# Patient Record
Sex: MALE | Race: WHITE | NOT HISPANIC OR LATINO | URBAN - METROPOLITAN AREA
[De-identification: names, ages, dates, MRNs, and addresses within clinical notes are randomized per-mention and may not be internally consistent; named-entity substitution may affect disease eponyms.]

---

## 2024-01-05 ENCOUNTER — APPOINTMENT (RX ONLY)
Dept: URBAN - METROPOLITAN AREA CLINIC 321 | Facility: CLINIC | Age: 64
Setting detail: DERMATOLOGY
End: 2024-01-05

## 2024-01-05 DIAGNOSIS — I78.8 OTHER DISEASES OF CAPILLARIES: ICD-10-CM

## 2024-01-05 DIAGNOSIS — L81.4 OTHER MELANIN HYPERPIGMENTATION: ICD-10-CM

## 2024-01-05 DIAGNOSIS — L82.1 OTHER SEBORRHEIC KERATOSIS: ICD-10-CM

## 2024-01-05 DIAGNOSIS — L57.0 ACTINIC KERATOSIS: ICD-10-CM

## 2024-01-05 DIAGNOSIS — D22 MELANOCYTIC NEVI: ICD-10-CM

## 2024-01-05 PROBLEM — D23.72 OTHER BENIGN NEOPLASM OF SKIN OF LEFT LOWER LIMB, INCLUDING HIP: Status: ACTIVE | Noted: 2024-01-05

## 2024-01-05 PROBLEM — D22.5 MELANOCYTIC NEVI OF TRUNK: Status: ACTIVE | Noted: 2024-01-05

## 2024-01-05 PROCEDURE — 17000 DESTRUCT PREMALG LESION: CPT

## 2024-01-05 PROCEDURE — ? FULL BODY SKIN EXAM

## 2024-01-05 PROCEDURE — ? TREATMENT REGIMEN

## 2024-01-05 PROCEDURE — 99203 OFFICE O/P NEW LOW 30 MIN: CPT | Mod: 25

## 2024-01-05 PROCEDURE — ? COUNSELING

## 2024-01-05 PROCEDURE — ? LIQUID NITROGEN

## 2024-01-05 ASSESSMENT — LOCATION ZONE DERM
LOCATION ZONE: TRUNK
LOCATION ZONE: ARM
LOCATION ZONE: EAR

## 2024-01-05 ASSESSMENT — LOCATION SIMPLE DESCRIPTION DERM
LOCATION SIMPLE: RIGHT EAR
LOCATION SIMPLE: LEFT UPPER BACK
LOCATION SIMPLE: LEFT EAR
LOCATION SIMPLE: LEFT UPPER ARM
LOCATION SIMPLE: ABDOMEN
LOCATION SIMPLE: CHEST

## 2024-01-05 ASSESSMENT — LOCATION DETAILED DESCRIPTION DERM
LOCATION DETAILED: RIGHT LATERAL SUPERIOR CHEST
LOCATION DETAILED: LEFT INFERIOR UPPER BACK
LOCATION DETAILED: LEFT ANTERIOR PROXIMAL UPPER ARM
LOCATION DETAILED: LEFT LATERAL ABDOMEN
LOCATION DETAILED: RIGHT INFERIOR CRUS OF ANTIHELIX
LOCATION DETAILED: LEFT SCAPHA

## 2024-01-05 NOTE — PROCEDURE: LIQUID NITROGEN
Render Note In Bullet Format When Appropriate: No
Application Tool (Optional): Liquid Nitrogen Sprayer
Show Applicator Variable?: Yes
Number Of Freeze-Thaw Cycles: 1 freeze-thaw cycle
Detail Level: Detailed
Consent: The patient's consent was obtained including but not limited to risks of crusting, scabbing, blistering, scarring, darker or lighter pigmentary change, recurrence, incomplete removal and infection.
Duration Of Freeze Thaw-Cycle (Seconds): 10
Post-Care Instructions: I reviewed with the patient in detail post-care instructions. Patient is to wear sunprotection, and avoid picking at any of the treated lesions. Pt may apply Vaseline to crusted or scabbing areas.

## 2024-10-18 ENCOUNTER — APPOINTMENT (RX ONLY)
Dept: URBAN - METROPOLITAN AREA CLINIC 319 | Facility: CLINIC | Age: 64
Setting detail: DERMATOLOGY
End: 2024-10-18

## 2024-10-18 DIAGNOSIS — L71.0 PERIORAL DERMATITIS: ICD-10-CM

## 2024-10-18 PROCEDURE — ? FULL BODY SKIN EXAM - DECLINED

## 2024-10-18 PROCEDURE — ? PRESCRIPTION MEDICATION MANAGEMENT

## 2024-10-18 PROCEDURE — ? PRESCRIPTION

## 2024-10-18 PROCEDURE — 99213 OFFICE O/P EST LOW 20 MIN: CPT

## 2024-10-18 PROCEDURE — ? COUNSELING

## 2024-10-18 RX ORDER — TACROLIMUS 1 MG/G
OINTMENT TOPICAL BID
Qty: 60 | Refills: 1 | Status: ERX | COMMUNITY
Start: 2024-10-18

## 2024-10-18 RX ORDER — DOXYCYCLINE HYCLATE 100 MG/1
CAPSULE, GELATIN COATED ORAL BID
Qty: 60 | Refills: 0 | Status: ERX | COMMUNITY
Start: 2024-10-18

## 2024-10-18 RX ORDER — KETOCONAZOLE 20 MG/G
CREAM TOPICAL QAM
Qty: 60 | Refills: 0 | Status: ERX | COMMUNITY
Start: 2024-10-18

## 2024-10-18 RX ADMIN — KETOCONAZOLE: 20 CREAM TOPICAL at 00:00

## 2024-10-18 RX ADMIN — DOXYCYCLINE HYCLATE: 100 CAPSULE, GELATIN COATED ORAL at 00:00

## 2024-10-18 RX ADMIN — TACROLIMUS: 1 OINTMENT TOPICAL at 00:00

## 2024-10-18 ASSESSMENT — LOCATION ZONE DERM: LOCATION ZONE: LIP

## 2024-10-18 ASSESSMENT — LOCATION SIMPLE DESCRIPTION DERM: LOCATION SIMPLE: UPPER LIP

## 2024-10-18 ASSESSMENT — LOCATION DETAILED DESCRIPTION DERM: LOCATION DETAILED: PHILTRUM

## 2024-10-18 NOTE — PROCEDURE: COUNSELING
Detail Level: Detailed
Patient Specific Counseling (Will Not Stick From Patient To Patient): -Discontinue treatment with all previous topical medications/creams at this time.

## 2024-10-18 NOTE — PROCEDURE: PRESCRIPTION MEDICATION MANAGEMENT
Initiate Treatment: Doxycycline bid x 1 month\\nTacrolimus and ketoconazole topically qd
Detail Level: Zone
Render In Strict Bullet Format?: No